# Patient Record
Sex: MALE | URBAN - METROPOLITAN AREA
[De-identification: names, ages, dates, MRNs, and addresses within clinical notes are randomized per-mention and may not be internally consistent; named-entity substitution may affect disease eponyms.]

---

## 2023-04-30 ENCOUNTER — HOSPITAL ENCOUNTER (OUTPATIENT)
Dept: SLEEP MEDICINE | Age: 38
Discharge: HOME | End: 2023-05-03
Payer: OTHER GOVERNMENT

## 2023-04-30 PROCEDURE — 95810 POLYSOM 6/> YRS 4/> PARAM: CPT

## 2023-05-10 ENCOUNTER — OFFICE VISIT (OUTPATIENT)
Dept: SLEEP MEDICINE | Age: 38
End: 2023-05-10
Payer: OTHER GOVERNMENT

## 2023-05-10 VITALS
HEIGHT: 71 IN | HEART RATE: 86 BPM | WEIGHT: 227.6 LBS | TEMPERATURE: 97.3 F | SYSTOLIC BLOOD PRESSURE: 138 MMHG | RESPIRATION RATE: 16 BRPM | BODY MASS INDEX: 31.86 KG/M2 | DIASTOLIC BLOOD PRESSURE: 62 MMHG | OXYGEN SATURATION: 98 %

## 2023-05-10 DIAGNOSIS — F51.5 NIGHTMARE DISORDER: ICD-10-CM

## 2023-05-10 DIAGNOSIS — G47.10 HYPERSOMNIA: ICD-10-CM

## 2023-05-10 DIAGNOSIS — G47.34 NOCTURNAL HYPOXEMIA: ICD-10-CM

## 2023-05-10 DIAGNOSIS — G47.33 OSA (OBSTRUCTIVE SLEEP APNEA): ICD-10-CM

## 2023-05-10 PROCEDURE — 99204 OFFICE O/P NEW MOD 45 MIN: CPT | Performed by: INTERNAL MEDICINE

## 2023-05-10 RX ORDER — PRAZOSIN HYDROCHLORIDE 1 MG/1
1 CAPSULE ORAL NIGHTLY
Qty: 30 CAPSULE | Refills: 3 | Status: SHIPPED | OUTPATIENT
Start: 2023-05-10

## 2023-05-10 RX ORDER — CHLORAL HYDRATE 500 MG
CAPSULE ORAL DAILY
COMMUNITY

## 2023-05-10 RX ORDER — PRAZOSIN HYDROCHLORIDE 1 MG/1
1 CAPSULE ORAL NIGHTLY
Qty: 30 CAPSULE | Refills: 3 | Status: SHIPPED | OUTPATIENT
Start: 2023-05-10 | End: 2023-05-10 | Stop reason: SDUPTHER

## 2023-05-10 RX ORDER — TRAZODONE HYDROCHLORIDE 100 MG/1
100 TABLET ORAL NIGHTLY
COMMUNITY

## 2023-05-10 RX ORDER — AZELASTINE HYDROCHLORIDE, FLUTICASONE PROPIONATE 137; 50 UG/1; UG/1
SPRAY, METERED NASAL
COMMUNITY

## 2023-05-10 RX ORDER — CETIRIZINE HYDROCHLORIDE 5 MG/1
TABLET ORAL
COMMUNITY

## 2023-05-10 RX ORDER — FLUTICASONE PROPIONATE 50 MCG
1 SPRAY, SUSPENSION (ML) NASAL DAILY
COMMUNITY

## 2023-05-10 ASSESSMENT — SLEEP AND FATIGUE QUESTIONNAIRES
HOW LIKELY ARE YOU TO NOD OFF OR FALL ASLEEP WHILE SITTING AND TALKING TO SOMEONE: 2
HOW LIKELY ARE YOU TO NOD OFF OR FALL ASLEEP WHILE WATCHING TV: 3
HOW LIKELY ARE YOU TO NOD OFF OR FALL ASLEEP IN A CAR, WHILE STOPPED FOR A FEW MINUTES IN TRAFFIC: 2
HOW LIKELY ARE YOU TO NOD OFF OR FALL ASLEEP WHEN YOU ARE A PASSENGER IN A CAR FOR AN HOUR WITHOUT A BREAK: 3
HOW LIKELY ARE YOU TO NOD OFF OR FALL ASLEEP WHILE LYING DOWN TO REST IN THE AFTERNOON WHEN CIRCUMSTANCES PERMIT: 3
HOW LIKELY ARE YOU TO NOD OFF OR FALL ASLEEP WHILE SITTING QUIETLY AFTER LUNCH WITHOUT ALCOHOL: 2
HOW LIKELY ARE YOU TO NOD OFF OR FALL ASLEEP WHILE SITTING INACTIVE IN A PUBLIC PLACE: 2
ESS TOTAL SCORE: 20
HOW LIKELY ARE YOU TO NOD OFF OR FALL ASLEEP WHILE SITTING AND READING: 3

## 2023-05-10 NOTE — PATIENT INSTRUCTIONS
use of this information. Content Version: 10.1.324134; Current as of: May 17, 2013                                    Learning About Physical Activity  What is physical activity? Physical activity is any kind of activity that gets your body moving. Being active means allowing your body to \"practice\" breathing, stretching, and lifting. The more practice your body gets, the better it works. The types of physical activity that can help you get fit and stay healthy include:  Aerobic or \"cardio\" activities that make your heart beat faster and make you breathe harder, such as brisk walking, riding a bike, or running. Aerobic activities strengthen your heart and lungs and build up your endurance. Strength training activities that make your muscles work against, or \"resist,\" something, such as lifting weights or doing push-ups. These activities help tone and strengthen your muscles. Stretches that allow you to move your joints and muscles through their full range of motion. Stretching helps you be more flexible and avoid injury. What are the benefits of physical activity? Being active is one of the best things you can do to get fit and stay healthy. It helps you to:  Feel stronger and have more energy to do all the things you like to do. Focus better at school or work and perform better in sports. Feel, think, and sleep better. Reach and stay at a healthy weight. Lose fat and build lean muscle. Lower your risk for serious health problems. Keep your bones, muscles, and joints strong. Being fit lets you do more physical activity. And it lets you work out harder without as much effort. How can you make physical activity part of your life? Get at least 30 minutes of exercise on most days of the week. Walking is a good choice. You also may want to do other activities, such as running, swimming, cycling, or playing tennis or team sports.   Pick activities that you like--ones that make your heart beat faster, your

## 2023-05-10 NOTE — PROGRESS NOTES
[facility]  [address] [facility] [address] Betancourt, [address] [place] [address] Court, [address] [facility]  [phone]    Patient Name:  Alexsandra Ness  YOB: 1985      Office Visit 5/10/2023    CHIEF COMPLAINT:      Chief Complaint   Patient presents with    New Patient       HISTORY OF PRESENT ILLNESS:      The patient present for evaluation and management of obstructive sleep apnea. The patient underwent a recent diagnostic polysomnography because of symptoms including snoring, witnessed apneas, morning headaches, daytime sleepiness, and waking up with a dry mouth. Additional symptoms include trouble falling asleep and staying asleep, excessive sweating, grinding his teeth, gastroesophageal reflux, nocturnal palpitations. There is no history of cataplexy, hypnagogic hallucinations, or sleep paralysis. In addition, there is history of frequent leg movements, kicking at night, or an inability to keep the legs still. Patient indicated that his wife told him that he is very active when he is sleep to the point he is almost acting in his dreams. He was in the [employer] for 12 years and he served in multiple areas including [address] [place] [address] and he had significant posttraumatic distress syndrome secondary to that. He was in the counseling and psychotherapy for quite some time. He also had been using trazodone 100 mg nightly for the last 3 to 4 months and it is helping his insomnia somewhat. His spouse who is apparently a nurse practitioner indicated to him that his symptoms are highly compatible with a sleep apnea. His spouse does have sleep apnea and uses CPAP machine and has significant improvement in her symptoms with that. The diagnostic polysomnography was notable for a respiratory disturbance index of 10.7/hour. Oxygen desaturations are low as 88% were noted. Significant cardiac arrhythmias were not evident.   The patient was noted to have minimal limb movements with a limb movement arousal index being about

## 2023-08-14 DIAGNOSIS — F51.5 NIGHTMARE DISORDER: ICD-10-CM

## 2023-08-15 RX ORDER — PRAZOSIN HYDROCHLORIDE 1 MG/1
CAPSULE ORAL
Qty: 30 CAPSULE | Refills: 1 | Status: SHIPPED | OUTPATIENT
Start: 2023-08-15

## 2023-09-08 NOTE — PROGRESS NOTES
[address] [place]  [address]  [phone]    Patient Name:  Sol Mcdonnell  YOB: 1985      Office Visit 9/11/2023    CHIEF COMPLAINT:      Chief Complaint   Patient presents with    CPAP/BiPAP    Sleep Apnea       HISTORY OF PRESENT ILLNESS:      The patient present for evaluation and management of obstructive sleep apnea. The patient underwent a recent diagnostic polysomnography which was notable for a respiratory disturbance index of 10.7/hour. Oxygen desaturations are low as 88% were noted. Significant cardiac arrhythmias were not evident. The patient was noted to have minimal limb movements with a limb movement arousal index being about 0.0. Patient indicated that his wife told him that he is very active when he is sleep to the point he is almost acting in his dreams. He was in the [employer] for 12 years and he served in multiple areas including [address] [place] [address] and he had significant posttraumatic distress syndrome secondary to that. He was in the counseling and psychotherapy for quite some time. He also was taking trazodone 100 mg nightly but he dropped that to 50 mg for the last 3 to 4 months and it is helping his insomnia somewhat. His spouse who is apparently a nurse practitioner indicated to him that his symptoms are highly compatible with a sleep apnea. His spouse does have sleep apnea and uses CPAP machine and has significant improvement in her symptoms with that. He was tried on prazosin 1 mg nightly to help him with his nightmare and he stated that it is helping him but he found out that CBD Gummies which he used it from a company he knows in [place] seem to be helping and he stopped using the prazosin. Since his last office visit, he was started on auto CPAP from the [facility] and he was given CPAP machine with a setting of 4-20 cm with a EPR 1.   He has 98% compliance with that and his average daily use was 5

## 2023-09-11 ENCOUNTER — OFFICE VISIT (OUTPATIENT)
Dept: SLEEP MEDICINE | Age: 38
End: 2023-09-11
Payer: OTHER GOVERNMENT

## 2023-09-11 VITALS
BODY MASS INDEX: 31.56 KG/M2 | TEMPERATURE: 98 F | OXYGEN SATURATION: 98 % | WEIGHT: 225.4 LBS | HEIGHT: 71 IN | DIASTOLIC BLOOD PRESSURE: 60 MMHG | SYSTOLIC BLOOD PRESSURE: 128 MMHG | HEART RATE: 73 BPM | RESPIRATION RATE: 16 BRPM

## 2023-09-11 DIAGNOSIS — G47.34 NOCTURNAL HYPOXEMIA: ICD-10-CM

## 2023-09-11 DIAGNOSIS — G47.33 OSA (OBSTRUCTIVE SLEEP APNEA): Primary | ICD-10-CM

## 2023-09-11 DIAGNOSIS — F51.5 NIGHTMARE DISORDER: ICD-10-CM

## 2023-09-11 DIAGNOSIS — G47.10 HYPERSOMNIA: ICD-10-CM

## 2023-09-11 PROCEDURE — 99214 OFFICE O/P EST MOD 30 MIN: CPT | Performed by: INTERNAL MEDICINE

## 2023-09-11 ASSESSMENT — SLEEP AND FATIGUE QUESTIONNAIRES
HOW LIKELY ARE YOU TO NOD OFF OR FALL ASLEEP WHILE SITTING AND TALKING TO SOMEONE: 1
HOW LIKELY ARE YOU TO NOD OFF OR FALL ASLEEP IN A CAR, WHILE STOPPED FOR A FEW MINUTES IN TRAFFIC: 1
HOW LIKELY ARE YOU TO NOD OFF OR FALL ASLEEP WHILE SITTING INACTIVE IN A PUBLIC PLACE: 1
HOW LIKELY ARE YOU TO NOD OFF OR FALL ASLEEP WHILE WATCHING TV: 2
HOW LIKELY ARE YOU TO NOD OFF OR FALL ASLEEP WHEN YOU ARE A PASSENGER IN A CAR FOR AN HOUR WITHOUT A BREAK: 2
ESS TOTAL SCORE: 13
HOW LIKELY ARE YOU TO NOD OFF OR FALL ASLEEP WHILE LYING DOWN TO REST IN THE AFTERNOON WHEN CIRCUMSTANCES PERMIT: 3
HOW LIKELY ARE YOU TO NOD OFF OR FALL ASLEEP WHILE SITTING AND READING: 2
HOW LIKELY ARE YOU TO NOD OFF OR FALL ASLEEP WHILE SITTING QUIETLY AFTER LUNCH WITHOUT ALCOHOL: 1